# Patient Record
Sex: MALE | Race: WHITE | Employment: FULL TIME | URBAN - METROPOLITAN AREA
[De-identification: names, ages, dates, MRNs, and addresses within clinical notes are randomized per-mention and may not be internally consistent; named-entity substitution may affect disease eponyms.]

---

## 2017-08-02 PROBLEM — M17.12 OSTEOARTHRITIS OF LEFT KNEE, UNSPECIFIED OSTEOARTHRITIS TYPE: Status: ACTIVE | Noted: 2017-08-02

## 2017-08-13 ENCOUNTER — HOSPITAL ENCOUNTER (OUTPATIENT)
Age: 47
Discharge: HOME | End: 2017-08-13
Attending: FAMILY MEDICINE
Payer: COMMERCIAL

## 2017-08-13 VITALS
SYSTOLIC BLOOD PRESSURE: 124 MMHG | TEMPERATURE: 98 F | HEART RATE: 56 BPM | OXYGEN SATURATION: 98 % | RESPIRATION RATE: 14 BRPM | DIASTOLIC BLOOD PRESSURE: 90 MMHG

## 2017-08-13 DIAGNOSIS — T63.441A BEE STING REACTION, ACCIDENTAL OR UNINTENTIONAL, INITIAL ENCOUNTER: Primary | ICD-10-CM

## 2017-08-13 LAB
#LYMPHOCYTE IC: 2.7 X10ˆ3/UL (ref 0.9–3.2)
#MXD IC: 0.3 X10ˆ3/UL (ref 0.1–1)
#NEUTROPHIL IC: 2.8 X10ˆ3/UL (ref 1.3–6.7)
CREAT SERPL-MCNC: 1 MG/DL (ref 0.7–1.2)
GLUCOSE BLD-MCNC: 101 MG/DL (ref 65–99)
HCT IC: 41.3 % (ref 37–54)
HGB IC: 14.3 G/DL (ref 13–17)
ISTAT BLOOD GAS TCO2: 25 MMOL/L (ref 22–32)
ISTAT BUN: 10 MG/DL (ref 8–20)
ISTAT CHLORIDE: 102 MMOL/L (ref 101–111)
ISTAT HEMATOCRIT: 44 % (ref 37–54)
ISTAT IONIZED CALCIUM: 1.2 MMOL/L (ref 1.12–1.32)
ISTAT POTASSIUM: 4.3 MMOL/L (ref 3.6–5.1)
ISTAT SODIUM: 138 MMOL/L (ref 136–144)
ISTAT TROPONIN: <0.1 NG/ML (ref ?–0.1)
LYMPHOCYTES NFR BLD AUTO: 46.9 %
MCH IC: 30.6 PG (ref 27–33.2)
MCHC IC: 34.6 G/DL (ref 31–37)
MCV IC: 88.4 FL (ref 80–99)
MIXED CELL %: 5.1 %
NEUTROPHILS NFR BLD AUTO: 48 %
PLT IC: 201 X10ˆ3/UL (ref 150–450)
RBC IC: 4.67 X10ˆ6/UL (ref 4.3–5.7)
WBC IC: 5.8 X10ˆ3/UL (ref 4–13)

## 2017-08-13 PROCEDURE — 93010 ELECTROCARDIOGRAM REPORT: CPT

## 2017-08-13 PROCEDURE — 96374 THER/PROPH/DIAG INJ IV PUSH: CPT

## 2017-08-13 PROCEDURE — 84484 ASSAY OF TROPONIN QUANT: CPT

## 2017-08-13 PROCEDURE — 93005 ELECTROCARDIOGRAM TRACING: CPT

## 2017-08-13 PROCEDURE — 93010 ELECTROCARDIOGRAM REPORT: CPT | Performed by: INTERNAL MEDICINE

## 2017-08-13 PROCEDURE — 99214 OFFICE O/P EST MOD 30 MIN: CPT

## 2017-08-13 PROCEDURE — 80047 BASIC METABLC PNL IONIZED CA: CPT

## 2017-08-13 PROCEDURE — 85025 COMPLETE CBC W/AUTO DIFF WBC: CPT | Performed by: FAMILY MEDICINE

## 2017-08-13 RX ORDER — PREDNISONE 20 MG/1
20 TABLET ORAL 2 TIMES DAILY
Qty: 14 TABLET | Refills: 0 | Status: SHIPPED | OUTPATIENT
Start: 2017-08-13 | End: 2017-08-20

## 2017-08-13 RX ORDER — FEXOFENADINE HCL 180 MG/1
180 TABLET ORAL DAILY
Qty: 7 TABLET | Refills: 0 | Status: SHIPPED | OUTPATIENT
Start: 2017-08-13 | End: 2017-08-20

## 2017-08-13 RX ORDER — METHYLPREDNISOLONE SODIUM SUCCINATE 125 MG/2ML
125 INJECTION, POWDER, LYOPHILIZED, FOR SOLUTION INTRAMUSCULAR; INTRAVENOUS ONCE
Status: COMPLETED | OUTPATIENT
Start: 2017-08-13 | End: 2017-08-13

## 2017-08-13 NOTE — ED PROVIDER NOTES
Patient Seen in: [facility]    History   Patient presents with:  Bite Sting,Insect (integumentary)    Stated Complaint: CHEST PAIN, BEE STINGS     HPI    Patient was in his yard around 1015 this morning and he was stung by Comment: twice a week      Review of Systems    Positive for stated complaint: CHEST PAIN, BEE STINGS   Other systems are as noted in HPI. Constitutional and vital signs reviewed. All other systems reviewed and negative except as noted above.     PS Hien  [phone]      As needed, If symptoms worsen      Medications Prescribed:  Current Discharge Medication List    START taking these medications    predniSONE 20 MG Oral Tab  Take 1 tablet (20 mg total) by mouth 2 (two)

## 2017-08-13 NOTE — ED INITIAL ASSESSMENT (HPI)
PT. Was stung by bee to left temple area at 10AM today. Within 15 minutes he developed a circular raised rash to right forearm. He marked the margins, which are expanding. He had felt flushed, sweating, and had right front chest muscle pain.

## 2017-08-13 NOTE — ED NOTES
Right forearm wound not getting worse. Pt. Reports he feels it less tight. Dr. Cecil Mariscal has re-evaluated the area.

## 2017-08-14 LAB
ATRIAL RATE: 62 BPM
P AXIS: 51 DEGREES
P-R INTERVAL: 180 MS
Q-T INTERVAL: 436 MS
QRS DURATION: 80 MS
QTC CALCULATION (BEZET): 442 MS
R AXIS: 50 DEGREES
T AXIS: 38 DEGREES
VENTRICULAR RATE: 62 BPM

## 2018-05-10 PROBLEM — Z00.00 LABORATORY EXAMINATION ORDERED AS PART OF A ROUTINE GENERAL MEDICAL EXAMINATION: Status: ACTIVE | Noted: 2018-05-10

## 2019-03-19 PROBLEM — M17.12 OSTEOARTHRITIS OF LEFT KNEE, UNSPECIFIED OSTEOARTHRITIS TYPE: Status: RESOLVED | Noted: 2017-08-02 | Resolved: 2019-03-19

## 2019-03-19 PROBLEM — Z00.00 LABORATORY EXAMINATION ORDERED AS PART OF A ROUTINE GENERAL MEDICAL EXAMINATION: Status: RESOLVED | Noted: 2018-05-10 | Resolved: 2019-03-19

## 2019-05-09 PROBLEM — E74.39 GLUCOSE INTOLERANCE: Status: ACTIVE | Noted: 2019-05-09
